# Patient Record
Sex: MALE | Race: WHITE | NOT HISPANIC OR LATINO | Employment: OTHER | URBAN - NONMETROPOLITAN AREA
[De-identification: names, ages, dates, MRNs, and addresses within clinical notes are randomized per-mention and may not be internally consistent; named-entity substitution may affect disease eponyms.]

---

## 2023-05-18 ENCOUNTER — HOSPITAL ENCOUNTER (EMERGENCY)
Facility: HOSPITAL | Age: 68
Discharge: HOME | End: 2023-05-18
Attending: STUDENT IN AN ORGANIZED HEALTH CARE EDUCATION/TRAINING PROGRAM
Payer: MEDICARE

## 2023-05-18 ENCOUNTER — APPOINTMENT (OUTPATIENT)
Dept: GENERAL RADIOLOGY | Facility: HOSPITAL | Age: 68
End: 2023-05-18
Payer: MEDICARE

## 2023-05-18 VITALS
HEART RATE: 88 BPM | HEIGHT: 70 IN | DIASTOLIC BLOOD PRESSURE: 92 MMHG | TEMPERATURE: 98.1 F | BODY MASS INDEX: 30.35 KG/M2 | OXYGEN SATURATION: 99 % | RESPIRATION RATE: 20 BRPM | SYSTOLIC BLOOD PRESSURE: 130 MMHG | WEIGHT: 212 LBS

## 2023-05-18 DIAGNOSIS — T78.40XA ALLERGIC REACTION, INITIAL ENCOUNTER: Primary | ICD-10-CM

## 2023-05-18 DIAGNOSIS — S61.215A LACERATION OF LEFT RING FINGER WITHOUT FOREIGN BODY WITHOUT DAMAGE TO NAIL, INITIAL ENCOUNTER: ICD-10-CM

## 2023-05-18 DIAGNOSIS — R06.02 SHORTNESS OF BREATH: ICD-10-CM

## 2023-05-18 DIAGNOSIS — L50.9 URTICARIA: ICD-10-CM

## 2023-05-18 PROCEDURE — 90715 TDAP VACCINE 7 YRS/> IM: CPT | Performed by: STUDENT IN AN ORGANIZED HEALTH CARE EDUCATION/TRAINING PROGRAM

## 2023-05-18 PROCEDURE — 71045 X-RAY EXAM CHEST 1 VIEW: CPT

## 2023-05-18 PROCEDURE — 63710000001 DEXAMETHASONE PER 0.25 MG: Performed by: STUDENT IN AN ORGANIZED HEALTH CARE EDUCATION/TRAINING PROGRAM

## 2023-05-18 PROCEDURE — 99283 EMERGENCY DEPT VISIT LOW MDM: CPT

## 2023-05-18 PROCEDURE — 93005 ELECTROCARDIOGRAM TRACING: CPT

## 2023-05-18 PROCEDURE — 25010000002 TETANUS-DIPHTH-ACELL PERTUSSIS 5-2.5-18.5 LF-MCG/0.5 SUSPENSION PREFILLED SYRINGE: Performed by: STUDENT IN AN ORGANIZED HEALTH CARE EDUCATION/TRAINING PROGRAM

## 2023-05-18 PROCEDURE — 90471 IMMUNIZATION ADMIN: CPT | Performed by: STUDENT IN AN ORGANIZED HEALTH CARE EDUCATION/TRAINING PROGRAM

## 2023-05-18 RX ORDER — METHYLPREDNISOLONE 4 MG/1
TABLET ORAL
Qty: 21 TABLET | Refills: 0 | Status: SHIPPED | OUTPATIENT
Start: 2023-05-18

## 2023-05-18 RX ORDER — DEXAMETHASONE 4 MG/1
10 TABLET ORAL ONCE
Status: COMPLETED | OUTPATIENT
Start: 2023-05-18 | End: 2023-05-18

## 2023-05-18 RX ORDER — DIPHENHYDRAMINE HCL 25 MG
25 TABLET ORAL EVERY 6 HOURS PRN
Qty: 20 TABLET | Refills: 0 | Status: SHIPPED | OUTPATIENT
Start: 2023-05-18 | End: 2023-05-23

## 2023-05-18 RX ADMIN — DEXAMETHASONE 10 MG: 4 TABLET ORAL at 01:37

## 2023-05-18 RX ADMIN — TETANUS TOXOID, REDUCED DIPHTHERIA TOXOID AND ACELLULAR PERTUSSIS VACCINE, ADSORBED 0.5 ML: 5; 2.5; 8; 8; 2.5 SUSPENSION INTRAMUSCULAR at 01:39

## 2023-05-18 NOTE — Clinical Note
[facility] EMERGENCY DEPARTMENT  [facility] [facility] [address]  Phone: [phone]    Abhay Miller was seen and treated in our emergency department on 5/18/2023.  He may return to work on 05/20/2023.         Thank you for choosing [facility].    Marcelino Gomez MD

## 2023-05-18 NOTE — DISCHARGE INSTRUCTIONS
Today you are seen for your symptoms which are most likely due to allergic reaction.  Please take the prescribed steroids and take Benadryl as needed to help with itching.  I want you to follow-up with your primary care provider.  If continued have allergic reactions I think would be a good idea to follow-up with an allergist as an outpatient as well.  If your symptoms worsen prior particularly worsening shortness of breath sensation of throat closure please immediately call 911 and return to the emergency department.

## 2023-05-18 NOTE — ED PROVIDER NOTES
"EMERGENCY DEPARTMENT ATTENDING NOTE    Patient Name: Abhay Miller    Chief Complaint   Patient presents with    Shortness of Breath    Rash       PATIENT PRESENTATION:  Abhay Miller is a very pleasant 67 y.o. male with a prior history of allergic reaction who presents emerged department due to concern for allergic reaction.    Patient states he had some allergic reaction the past is not sure exactly he is allergic to.  He put on a over-the-counter supplement lotion for muscle pain and a few hours later woke up with a rash over his body.  He took 2 dose of Benadryl states it has significantly proved but came due to having some itching.  Also feeling mildly short of breath.  Denies any sensation of throat closure.  No history of anaphylaxis.  Otherwise no fevers or chills no nausea or vomiting no abdominal pain.    Of note following my interview patient asked me to look at his finger he had cut it at approximately 0800 yesterday.  He placed a Band-Aid over it.      PHYSICAL EXAM:   VS: /92   Pulse 88   Temp 98.1 °F (36.7 °C)   Resp 20   Ht 177.8 cm (70\")   Wt 96.2 kg (212 lb)   SpO2 99%   BMI 30.42 kg/m²   GENERAL: Well-appearing middle-age man sitting in stretcher no acute distress; well-nourished, well-developed, awake, alert, no acute distress, nontoxic appearing, comfortable  EYES: PERRL, sclerae anicteric, extraocular movements grossly intact, symmetric lids  EARS, NOSE, MOUTH, THROAT: atraumatic external nose and ears, moist mucous membranes  NECK: symmetric, trachea midline  RESPIRATORY: unlabored respiratory effort, clear to auscultation bilaterally, good air movement; no*extra wheezing; no stridor  CARDIOVASCULAR: no murmurs, peripheral pulses 2+ and equal in all extremities  GI: soft, nontender, nondistended  MUSCULOSKELETAL/EXTREMITIES: extremities without obvious deformity  SKIN: Diffuse urticaria on the patient's groin chest legs and arms as seen in the photo below; otherwise skin warm and dry " with no obvious rashes      NEUROLOGIC: moving all 4 extremities symmetrically, CN II-XII grossly intact  PSYCHIATRIC: alert, pleasant and cooperative. Appropriate mood and affect.      MEDICAL DECISION MAKING:    Abhay Miller is a 67 y.o. male presents to the emergency department due to concerns for allergic reaction.    Differential Diagnosis Considered: Urticaria, allergic reaction, anaphylaxis    Imaging Ordered:   XR Chest 1 View    (Results Pending)       Internal chart review:   History reviewed. No pertinent past medical history.    History reviewed. No pertinent surgical history.    No Known Allergies    No current facility-administered medications for this encounter.    Current Outpatient Medications:     diphenhydrAMINE (BENADRYL) 25 MG tablet, Take 1 tablet by mouth Every 6 (Six) Hours As Needed for Itching for up to 5 days., Disp: 20 tablet, Rfl: 0    methylPREDNISolone (MEDROL) 4 MG dose pack, Take as directed on package instructions., Disp: 21 tablet, Rfl: 0    My imaging interpretation: Chest x-ray with no acute findings.    ED Course and Re-evaluation: 68yo M with prior history of allergic reactions presenting to the emergency department due to concerns for allergic reaction.  His initial presentation is not consistent with anaphylaxis he has no wheezing on exam he is not having any nausea although he is complaining of shortness of breath he is not tachypneic always vital signs are reassuring he does has urticaria no occasion for epinephrine.  Patient did take Benadryl at home and reported improvement of symptoms prior to arrival.  He was dosed with oral Decadron.  Separately patient also complaining of finger laceration and it is more than 15 hours old no indication for laceration repair to does not appear to have good cosmesis I had my nurse irrigate the wound and dressed cleanly.  Patient was also given a Tdap given mechanism.  Patient was observed for over 2 hours with no change in his clinical  status patient continued to feel improved.  Patient discharged home with Medrol Dosepak as well as plan for Benadryl with plan to follow-up with primary care provider within 2 days for further management given return precautions emergency department.      ED Diagnosis:  Allergic reaction, initial encounter; Urticaria; Shortness of breath; Laceration of left ring finger without foreign body without damage to nail, initial encounter    Disposition: to home  Follow up plan: PCP follow up within 2 days, return to ED immediately if symptoms worsen        Signed:  Marcelino Gomez MD  Emergency Medicine Physician    Please note that portions of this note were completed with a voice recognition program.      Marcelino Gomez MD  05/18/23 042

## 2023-05-19 LAB
QT INTERVAL: 414 MS
QTC INTERVAL: 468 MS